# Patient Record
Sex: FEMALE | URBAN - METROPOLITAN AREA
[De-identification: names, ages, dates, MRNs, and addresses within clinical notes are randomized per-mention and may not be internally consistent; named-entity substitution may affect disease eponyms.]

---

## 2018-03-28 ENCOUNTER — APPOINTMENT (RX ONLY)
Dept: URBAN - METROPOLITAN AREA CLINIC 81 | Facility: CLINIC | Age: 73
Setting detail: DERMATOLOGY
End: 2018-03-28

## 2018-03-28 DIAGNOSIS — Z41.9 ENCOUNTER FOR PROCEDURE FOR PURPOSES OTHER THAN REMEDYING HEALTH STATE, UNSPECIFIED: ICD-10-CM

## 2018-03-28 PROCEDURE — ? JUVEDERM VOLLURE XC INJECTION

## 2018-03-28 NOTE — PROCEDURE: JUVEDERM VOLLURE XC INJECTION
Price (Use Numbers Only, No Special Characters Or $): 633 Price (Use Numbers Only, No Special Characters Or $): 536

## 2018-04-23 ENCOUNTER — APPOINTMENT (RX ONLY)
Dept: URBAN - METROPOLITAN AREA CLINIC 81 | Facility: CLINIC | Age: 73
Setting detail: DERMATOLOGY
End: 2018-04-23

## 2018-04-23 DIAGNOSIS — Z41.9 ENCOUNTER FOR PROCEDURE FOR PURPOSES OTHER THAN REMEDYING HEALTH STATE, UNSPECIFIED: ICD-10-CM

## 2018-04-23 PROCEDURE — ? COSMETIC EXCISION

## 2018-04-23 PROCEDURE — ? COSMETIC FOLLOW-UP

## 2018-04-23 ASSESSMENT — LOCATION DETAILED DESCRIPTION DERM
LOCATION DETAILED: LEFT LATERAL CANTHUS
LOCATION DETAILED: RIGHT LATERAL CANTHUS

## 2018-04-23 ASSESSMENT — LOCATION SIMPLE DESCRIPTION DERM
LOCATION SIMPLE: RIGHT EYELID
LOCATION SIMPLE: LEFT EYELID

## 2018-04-23 ASSESSMENT — LOCATION ZONE DERM: LOCATION ZONE: EYELID

## 2018-04-23 NOTE — HPI: OTHER
Condition:: Cosmetic recheck 8 months post lower lid blepharoplasty
Please Describe Your Condition:: Raised area at lateral lower lid

## 2018-09-25 ENCOUNTER — APPOINTMENT (RX ONLY)
Dept: URBAN - METROPOLITAN AREA CLINIC 81 | Facility: CLINIC | Age: 73
Setting detail: DERMATOLOGY
End: 2018-09-25

## 2018-09-25 DIAGNOSIS — L82.1 OTHER SEBORRHEIC KERATOSIS: ICD-10-CM

## 2018-09-25 DIAGNOSIS — L72.11 PILAR CYST: ICD-10-CM

## 2018-09-25 DIAGNOSIS — D22 MELANOCYTIC NEVI: ICD-10-CM

## 2018-09-25 DIAGNOSIS — D18.0 HEMANGIOMA: ICD-10-CM

## 2018-09-25 DIAGNOSIS — L81.4 OTHER MELANIN HYPERPIGMENTATION: ICD-10-CM

## 2018-09-25 DIAGNOSIS — Z85.828 PERSONAL HISTORY OF OTHER MALIGNANT NEOPLASM OF SKIN: ICD-10-CM

## 2018-09-25 PROBLEM — D18.01 HEMANGIOMA OF SKIN AND SUBCUTANEOUS TISSUE: Status: ACTIVE | Noted: 2018-09-25

## 2018-09-25 PROBLEM — D22.5 MELANOCYTIC NEVI OF TRUNK: Status: ACTIVE | Noted: 2018-09-25

## 2018-09-25 PROCEDURE — ? COUNSELING

## 2018-09-25 PROCEDURE — 99213 OFFICE O/P EST LOW 20 MIN: CPT

## 2018-09-25 PROCEDURE — ? REFERRAL

## 2018-09-25 ASSESSMENT — LOCATION ZONE DERM
LOCATION ZONE: TRUNK
LOCATION ZONE: SCALP
LOCATION ZONE: ARM

## 2018-09-25 ASSESSMENT — LOCATION DETAILED DESCRIPTION DERM
LOCATION DETAILED: LEFT CENTRAL FRONTAL SCALP
LOCATION DETAILED: RIGHT INFERIOR MEDIAL UPPER BACK
LOCATION DETAILED: LEFT PROXIMAL DORSAL FOREARM
LOCATION DETAILED: INFERIOR THORACIC SPINE
LOCATION DETAILED: PERIUMBILICAL SKIN

## 2018-09-25 ASSESSMENT — LOCATION SIMPLE DESCRIPTION DERM
LOCATION SIMPLE: UPPER BACK
LOCATION SIMPLE: ABDOMEN
LOCATION SIMPLE: LEFT FOREARM
LOCATION SIMPLE: LEFT SCALP
LOCATION SIMPLE: RIGHT UPPER BACK

## 2018-11-07 ENCOUNTER — APPOINTMENT (RX ONLY)
Dept: URBAN - METROPOLITAN AREA CLINIC 81 | Facility: CLINIC | Age: 73
Setting detail: DERMATOLOGY
End: 2018-11-07

## 2018-11-07 DIAGNOSIS — Z41.9 ENCOUNTER FOR PROCEDURE FOR PURPOSES OTHER THAN REMEDYING HEALTH STATE, UNSPECIFIED: ICD-10-CM

## 2018-11-07 PROCEDURE — ? JUVEDERM VOLUMA XC INJECTION

## 2018-11-07 PROCEDURE — ? ADDITIONAL NOTES

## 2018-11-07 NOTE — PROCEDURE: JUVEDERM VOLUMA XC INJECTION
Price (Use Numbers Only, No Special Characters Or $): 451 Price (Use Numbers Only, No Special Characters Or $): 663

## 2018-11-07 NOTE — PROCEDURE: JUVEDERM VOLUMA XC INJECTION
Price (Use Numbers Only, No Special Characters Or $): 827 Price (Use Numbers Only, No Special Characters Or $): 385

## 2019-04-04 ENCOUNTER — APPOINTMENT (RX ONLY)
Dept: URBAN - METROPOLITAN AREA CLINIC 81 | Facility: CLINIC | Age: 74
Setting detail: DERMATOLOGY
End: 2019-04-04

## 2019-04-04 DIAGNOSIS — Z41.9 ENCOUNTER FOR PROCEDURE FOR PURPOSES OTHER THAN REMEDYING HEALTH STATE, UNSPECIFIED: ICD-10-CM

## 2019-04-04 PROCEDURE — ? BOTOX

## 2019-04-04 PROCEDURE — ? JUVEDERM VOLLURE XC INJECTION

## 2019-04-04 PROCEDURE — ? JUVEDERM VOLUMA XC INJECTION

## 2019-04-04 NOTE — PROCEDURE: JUVEDERM VOLUMA XC INJECTION
Price (Use Numbers Only, No Special Characters Or $): 6631 Price (Use Numbers Only, No Special Characters Or $): 0527

## 2019-04-04 NOTE — PROCEDURE: JUVEDERM VOLLURE XC INJECTION
Price (Use Numbers Only, No Special Characters Or $): 310 Price (Use Numbers Only, No Special Characters Or $): 138

## 2019-04-04 NOTE — PROCEDURE: BOTOX
Price (Use Numbers Only, No Special Characters Or $): 388 Price (Use Numbers Only, No Special Characters Or $): 959

## 2019-04-17 ENCOUNTER — APPOINTMENT (RX ONLY)
Dept: URBAN - METROPOLITAN AREA CLINIC 81 | Facility: CLINIC | Age: 74
Setting detail: DERMATOLOGY
End: 2019-04-17

## 2019-04-17 DIAGNOSIS — Z41.9 ENCOUNTER FOR PROCEDURE FOR PURPOSES OTHER THAN REMEDYING HEALTH STATE, UNSPECIFIED: ICD-10-CM

## 2019-04-17 PROCEDURE — ? ADDITIONAL NOTES

## 2019-04-17 NOTE — PROCEDURE: ADDITIONAL NOTES
Additional Notes: Pt here stating upper eyelids are dropping/feeling heavy.\\nPer DF stated to give it a month. \\nPictures taken.
Detail Level: Detailed

## 2019-04-25 ENCOUNTER — APPOINTMENT (RX ONLY)
Dept: URBAN - METROPOLITAN AREA CLINIC 81 | Facility: CLINIC | Age: 74
Setting detail: DERMATOLOGY
End: 2019-04-25

## 2019-04-25 ENCOUNTER — RX ONLY (OUTPATIENT)
Age: 74
Setting detail: RX ONLY
End: 2019-04-25

## 2019-04-25 RX ORDER — PREDNISONE 10 MG/1
TABLET ORAL QAM
Qty: 30 | Refills: 0 | Status: ERX

## 2019-11-06 ENCOUNTER — APPOINTMENT (RX ONLY)
Dept: URBAN - METROPOLITAN AREA CLINIC 81 | Facility: CLINIC | Age: 74
Setting detail: DERMATOLOGY
End: 2019-11-06

## 2019-11-06 DIAGNOSIS — Z41.9 ENCOUNTER FOR PROCEDURE FOR PURPOSES OTHER THAN REMEDYING HEALTH STATE, UNSPECIFIED: ICD-10-CM

## 2019-11-06 PROCEDURE — ? JUVEDERM VOLLURE XC INJECTION

## 2019-11-06 NOTE — PROCEDURE: JUVEDERM VOLLURE XC INJECTION
Price (Use Numbers Only, No Special Characters Or $): 586 Price (Use Numbers Only, No Special Characters Or $): 958

## 2019-11-12 ENCOUNTER — APPOINTMENT (RX ONLY)
Dept: URBAN - METROPOLITAN AREA CLINIC 81 | Facility: CLINIC | Age: 74
Setting detail: DERMATOLOGY
End: 2019-11-12

## 2019-11-12 DIAGNOSIS — D18.0 HEMANGIOMA: ICD-10-CM

## 2019-11-12 DIAGNOSIS — D22 MELANOCYTIC NEVI: ICD-10-CM

## 2019-11-12 DIAGNOSIS — L81.4 OTHER MELANIN HYPERPIGMENTATION: ICD-10-CM

## 2019-11-12 DIAGNOSIS — L259 CONTACT DERMATITIS AND OTHER ECZEMA, UNSPECIFIED CAUSE: ICD-10-CM

## 2019-11-12 DIAGNOSIS — Z85.828 PERSONAL HISTORY OF OTHER MALIGNANT NEOPLASM OF SKIN: ICD-10-CM

## 2019-11-12 DIAGNOSIS — L82.1 OTHER SEBORRHEIC KERATOSIS: ICD-10-CM

## 2019-11-12 PROBLEM — D18.01 HEMANGIOMA OF SKIN AND SUBCUTANEOUS TISSUE: Status: ACTIVE | Noted: 2019-11-12

## 2019-11-12 PROBLEM — D22.5 MELANOCYTIC NEVI OF TRUNK: Status: ACTIVE | Noted: 2019-11-12

## 2019-11-12 PROBLEM — L30.8 OTHER SPECIFIED DERMATITIS: Status: ACTIVE | Noted: 2019-11-12

## 2019-11-12 PROCEDURE — 99214 OFFICE O/P EST MOD 30 MIN: CPT

## 2019-11-12 PROCEDURE — ? COUNSELING

## 2019-11-12 PROCEDURE — ? COUNSELING: TOPICAL STEROIDS

## 2019-11-12 PROCEDURE — ? ADDITIONAL NOTES

## 2019-11-12 PROCEDURE — ? PRESCRIPTION

## 2019-11-12 RX ORDER — MOMETASONE FUROATE 1 MG/G
CREAM TOPICAL BID
Qty: 1 | Refills: 1 | Status: ERX | COMMUNITY
Start: 2019-11-12

## 2019-11-12 RX ADMIN — MOMETASONE FUROATE: 1 CREAM TOPICAL at 00:00

## 2019-11-12 ASSESSMENT — LOCATION SIMPLE DESCRIPTION DERM
LOCATION SIMPLE: RIGHT UPPER BACK
LOCATION SIMPLE: UPPER BACK
LOCATION SIMPLE: LEFT FOREARM
LOCATION SIMPLE: ABDOMEN

## 2019-11-12 ASSESSMENT — LOCATION ZONE DERM
LOCATION ZONE: ARM
LOCATION ZONE: TRUNK

## 2019-11-12 ASSESSMENT — LOCATION DETAILED DESCRIPTION DERM
LOCATION DETAILED: RIGHT INFERIOR MEDIAL UPPER BACK
LOCATION DETAILED: EPIGASTRIC SKIN
LOCATION DETAILED: INFERIOR THORACIC SPINE
LOCATION DETAILED: LEFT PROXIMAL DORSAL FOREARM
LOCATION DETAILED: PERIUMBILICAL SKIN

## 2019-11-12 NOTE — PROCEDURE: MIPS QUALITY
Quality 431: Preventive Care And Screening: Unhealthy Alcohol Use - Screening: Patient screened for unhealthy alcohol use using a single question and scores less than 2 times per year
Quality 110: Preventive Care And Screening: Influenza Immunization: Influenza Immunization Administered during Influenza season
Quality 111:Pneumonia Vaccination Status For Older Adults: Pneumococcal Vaccination Previously Received
Detail Level: Detailed
Quality 130: Documentation Of Current Medications In The Medical Record: Current Medications Documented
Quality 402: Tobacco Use And Help With Quitting Among Adolescents: Patient screened for tobacco and never smoked

## 2019-11-12 NOTE — PROCEDURE: ADDITIONAL NOTES
Detail Level: Zone
Additional Notes: Advised pt if no imp or if rash recurs, to RTC for punch bx. Pt understands and agrees

## 2020-07-30 ENCOUNTER — APPOINTMENT (RX ONLY)
Dept: URBAN - METROPOLITAN AREA CLINIC 81 | Facility: CLINIC | Age: 75
Setting detail: DERMATOLOGY
End: 2020-07-30

## 2020-07-30 DIAGNOSIS — Z41.9 ENCOUNTER FOR PROCEDURE FOR PURPOSES OTHER THAN REMEDYING HEALTH STATE, UNSPECIFIED: ICD-10-CM

## 2020-07-30 PROCEDURE — ? JUVEDERM VOLLURE XC INJECTION

## 2020-07-30 NOTE — PROCEDURE: JUVEDERM VOLLURE XC INJECTION
Price (Use Numbers Only, No Special Characters Or $): 550 Price (Use Numbers Only, No Special Characters Or $): 564

## 2020-08-06 ENCOUNTER — APPOINTMENT (RX ONLY)
Dept: URBAN - METROPOLITAN AREA CLINIC 81 | Facility: CLINIC | Age: 75
Setting detail: DERMATOLOGY
End: 2020-08-06

## 2020-08-06 DIAGNOSIS — Z41.9 ENCOUNTER FOR PROCEDURE FOR PURPOSES OTHER THAN REMEDYING HEALTH STATE, UNSPECIFIED: ICD-10-CM

## 2020-08-06 PROCEDURE — ? OTHER (COSMETIC)

## 2020-08-06 NOTE — PROCEDURE: OTHER (COSMETIC)
Other (Free Text): Cool Peel
Price (Use Numbers Only, No Special Characters Or $): 500
Detail Level: Zone

## 2020-09-08 ENCOUNTER — APPOINTMENT (RX ONLY)
Dept: URBAN - METROPOLITAN AREA CLINIC 81 | Facility: CLINIC | Age: 75
Setting detail: DERMATOLOGY
End: 2020-09-08

## 2020-09-08 DIAGNOSIS — Z41.9 ENCOUNTER FOR PROCEDURE FOR PURPOSES OTHER THAN REMEDYING HEALTH STATE, UNSPECIFIED: ICD-10-CM

## 2020-09-08 PROCEDURE — ? OTHER (COSMETIC)

## 2020-09-08 NOTE — PROCEDURE: OTHER (COSMETIC)
Detail Level: Zone
Other (Free Text): Cool peel, 2nd treatment
Price (Use Numbers Only, No Special Characters Or $): 500

## 2021-03-25 ENCOUNTER — APPOINTMENT (RX ONLY)
Dept: URBAN - METROPOLITAN AREA CLINIC 81 | Facility: CLINIC | Age: 76
Setting detail: DERMATOLOGY
End: 2021-03-25

## 2021-03-25 DIAGNOSIS — Z41.9 ENCOUNTER FOR PROCEDURE FOR PURPOSES OTHER THAN REMEDYING HEALTH STATE, UNSPECIFIED: ICD-10-CM

## 2021-03-25 PROCEDURE — ? JUVEDERM VOLLURE XC INJECTION

## 2021-03-25 NOTE — PROCEDURE: JUVEDERM VOLLURE XC INJECTION
Price (Use Numbers Only, No Special Characters Or $): 958 Price (Use Numbers Only, No Special Characters Or $): 833

## 2021-07-08 ENCOUNTER — APPOINTMENT (RX ONLY)
Dept: URBAN - METROPOLITAN AREA CLINIC 81 | Facility: CLINIC | Age: 76
Setting detail: DERMATOLOGY
End: 2021-07-08

## 2021-07-08 DIAGNOSIS — D485 NEOPLASM OF UNCERTAIN BEHAVIOR OF SKIN: ICD-10-CM

## 2021-07-08 DIAGNOSIS — L57.8 OTHER SKIN CHANGES DUE TO CHRONIC EXPOSURE TO NONIONIZING RADIATION: ICD-10-CM

## 2021-07-08 PROBLEM — D48.5 NEOPLASM OF UNCERTAIN BEHAVIOR OF SKIN: Status: ACTIVE | Noted: 2021-07-08

## 2021-07-08 PROCEDURE — 99213 OFFICE O/P EST LOW 20 MIN: CPT

## 2021-07-08 PROCEDURE — ? ADDITIONAL NOTES

## 2021-07-08 PROCEDURE — ? COUNSELING

## 2021-07-08 ASSESSMENT — LOCATION SIMPLE DESCRIPTION DERM
LOCATION SIMPLE: SCALP
LOCATION SIMPLE: LEFT CHEEK

## 2021-07-08 ASSESSMENT — LOCATION ZONE DERM
LOCATION ZONE: FACE
LOCATION ZONE: SCALP

## 2021-07-08 ASSESSMENT — LOCATION DETAILED DESCRIPTION DERM
LOCATION DETAILED: LEFT CENTRAL PARIETAL SCALP
LOCATION DETAILED: LEFT INFERIOR CENTRAL MALAR CHEEK

## 2021-07-08 NOTE — HPI: SKIN LESION
What Type Of Note Output Would You Prefer (Optional)?: Bullet Format
Has Your Skin Lesion Been Treated?: not been treated
Is This A New Presentation, Or A Follow-Up?: Skin Lesion
Additional History: She reports having head aches that started occurring two weeks prior to her office visit. She also mentions having numbness and tingling on her lips.

## 2021-07-08 NOTE — PROCEDURE: ADDITIONAL NOTES
Detail Level: Detailed
Render Risk Assessment In Note?: no
Additional Notes: Advised to see head and neck surgeon Dr. Bro.

## 2022-03-31 ENCOUNTER — APPOINTMENT (RX ONLY)
Dept: URBAN - METROPOLITAN AREA CLINIC 81 | Facility: CLINIC | Age: 77
Setting detail: DERMATOLOGY
End: 2022-03-31

## 2022-03-31 DIAGNOSIS — Z41.9 ENCOUNTER FOR PROCEDURE FOR PURPOSES OTHER THAN REMEDYING HEALTH STATE, UNSPECIFIED: ICD-10-CM

## 2022-03-31 PROCEDURE — ? JUVEDERM VOLUMA XC INJECTION

## 2022-03-31 NOTE — PROCEDURE: JUVEDERM VOLUMA XC INJECTION
Price (Use Numbers Only, No Special Characters Or $): 3146 Price (Use Numbers Only, No Special Characters Or $): 4624

## 2022-07-21 ENCOUNTER — APPOINTMENT (RX ONLY)
Dept: URBAN - METROPOLITAN AREA CLINIC 81 | Facility: CLINIC | Age: 77
Setting detail: DERMATOLOGY
End: 2022-07-21

## 2022-07-21 DIAGNOSIS — Z41.9 ENCOUNTER FOR PROCEDURE FOR PURPOSES OTHER THAN REMEDYING HEALTH STATE, UNSPECIFIED: ICD-10-CM

## 2022-07-21 PROCEDURE — ? BOTOX

## 2022-07-21 NOTE — PROCEDURE: BOTOX
Price (Use Numbers Only, No Special Characters Or $): 833 Price (Use Numbers Only, No Special Characters Or $): 793

## 2022-11-28 ENCOUNTER — APPOINTMENT (RX ONLY)
Dept: URBAN - METROPOLITAN AREA CLINIC 81 | Facility: CLINIC | Age: 77
Setting detail: DERMATOLOGY
End: 2022-11-28

## 2022-11-28 DIAGNOSIS — Z41.9 ENCOUNTER FOR PROCEDURE FOR PURPOSES OTHER THAN REMEDYING HEALTH STATE, UNSPECIFIED: ICD-10-CM

## 2022-11-28 PROCEDURE — ? BOTOX

## 2022-11-28 PROCEDURE — ? JUVEDERM VOLLURE XC INJECTION

## 2022-11-28 ASSESSMENT — LOCATION SIMPLE DESCRIPTION DERM
LOCATION SIMPLE: LEFT CHEEK
LOCATION SIMPLE: RIGHT CHEEK

## 2022-11-28 ASSESSMENT — LOCATION DETAILED DESCRIPTION DERM
LOCATION DETAILED: RIGHT INFERIOR MEDIAL MALAR CHEEK
LOCATION DETAILED: LEFT INFERIOR MEDIAL MALAR CHEEK

## 2022-11-28 ASSESSMENT — LOCATION ZONE DERM: LOCATION ZONE: FACE

## 2022-11-28 NOTE — PROCEDURE: BOTOX
Price (Use Numbers Only, No Special Characters Or $): 947 Price (Use Numbers Only, No Special Characters Or $): 648

## 2022-11-28 NOTE — PROCEDURE: JUVEDERM VOLLURE XC INJECTION
Price (Use Numbers Only, No Special Characters Or $): 878 Price (Use Numbers Only, No Special Characters Or $): 811

## 2023-06-07 ENCOUNTER — APPOINTMENT (RX ONLY)
Dept: URBAN - METROPOLITAN AREA CLINIC 81 | Facility: CLINIC | Age: 78
Setting detail: DERMATOLOGY
End: 2023-06-07

## 2023-06-07 DIAGNOSIS — L57.8 OTHER SKIN CHANGES DUE TO CHRONIC EXPOSURE TO NONIONIZING RADIATION: ICD-10-CM

## 2023-06-07 DIAGNOSIS — L82.1 OTHER SEBORRHEIC KERATOSIS: ICD-10-CM

## 2023-06-07 DIAGNOSIS — D18.0 HEMANGIOMA: ICD-10-CM

## 2023-06-07 DIAGNOSIS — L81.4 OTHER MELANIN HYPERPIGMENTATION: ICD-10-CM

## 2023-06-07 DIAGNOSIS — D22 MELANOCYTIC NEVI: ICD-10-CM

## 2023-06-07 PROBLEM — D18.01 HEMANGIOMA OF SKIN AND SUBCUTANEOUS TISSUE: Status: ACTIVE | Noted: 2023-06-07

## 2023-06-07 PROBLEM — D22.5 MELANOCYTIC NEVI OF TRUNK: Status: ACTIVE | Noted: 2023-06-07

## 2023-06-07 PROCEDURE — ? SUNSCREEN RECOMMENDATIONS

## 2023-06-07 PROCEDURE — ? COUNSELING

## 2023-06-07 PROCEDURE — 99213 OFFICE O/P EST LOW 20 MIN: CPT

## 2023-06-07 ASSESSMENT — LOCATION SIMPLE DESCRIPTION DERM
LOCATION SIMPLE: RIGHT LOWER BACK
LOCATION SIMPLE: RIGHT FOREARM
LOCATION SIMPLE: RIGHT UPPER BACK
LOCATION SIMPLE: LEFT PRETIBIAL REGION
LOCATION SIMPLE: LEFT FOREARM
LOCATION SIMPLE: RIGHT FOREHEAD
LOCATION SIMPLE: CHEST
LOCATION SIMPLE: RIGHT PRETIBIAL REGION

## 2023-06-07 ASSESSMENT — LOCATION DETAILED DESCRIPTION DERM
LOCATION DETAILED: RIGHT INFERIOR MEDIAL FOREHEAD
LOCATION DETAILED: LEFT VENTRAL PROXIMAL FOREARM
LOCATION DETAILED: RIGHT SUPERIOR MEDIAL MIDBACK
LOCATION DETAILED: RIGHT VENTRAL PROXIMAL FOREARM
LOCATION DETAILED: LEFT PROXIMAL PRETIBIAL REGION
LOCATION DETAILED: RIGHT MID-UPPER BACK
LOCATION DETAILED: RIGHT PROXIMAL PRETIBIAL REGION
LOCATION DETAILED: RIGHT SUPERIOR UPPER BACK
LOCATION DETAILED: LEFT LATERAL SUPERIOR CHEST
LOCATION DETAILED: RIGHT SUPERIOR MEDIAL LOWER BACK

## 2023-06-07 ASSESSMENT — LOCATION ZONE DERM
LOCATION ZONE: LEG
LOCATION ZONE: FACE
LOCATION ZONE: ARM
LOCATION ZONE: TRUNK

## 2023-07-14 ENCOUNTER — APPOINTMENT (RX ONLY)
Dept: URBAN - METROPOLITAN AREA CLINIC 81 | Facility: CLINIC | Age: 78
Setting detail: DERMATOLOGY
End: 2023-07-14

## 2023-07-14 DIAGNOSIS — Z41.9 ENCOUNTER FOR PROCEDURE FOR PURPOSES OTHER THAN REMEDYING HEALTH STATE, UNSPECIFIED: ICD-10-CM

## 2023-07-14 PROCEDURE — ? BOTOX

## 2023-07-14 PROCEDURE — ? JUVEDERM VOLUX XC INJECTION

## 2023-07-14 NOTE — PROCEDURE: BOTOX
Price (Use Numbers Only, No Special Characters Or $): 755 Price (Use Numbers Only, No Special Characters Or $): 197

## 2023-07-14 NOTE — PROCEDURE: JUVEDERM VOLUX XC INJECTION
Price (Use Numbers Only, No Special Characters Or $): 4649 Price (Use Numbers Only, No Special Characters Or $): 7961

## 2024-06-19 ENCOUNTER — APPOINTMENT (RX ONLY)
Dept: URBAN - METROPOLITAN AREA CLINIC 81 | Facility: CLINIC | Age: 79
Setting detail: DERMATOLOGY
End: 2024-06-19

## 2024-06-19 DIAGNOSIS — Z41.9 ENCOUNTER FOR PROCEDURE FOR PURPOSES OTHER THAN REMEDYING HEALTH STATE, UNSPECIFIED: ICD-10-CM

## 2024-06-19 PROCEDURE — ? BOTOX (U OR CC)

## 2024-06-19 ASSESSMENT — LOCATION DETAILED DESCRIPTION DERM
LOCATION DETAILED: INFERIOR MID FOREHEAD
LOCATION DETAILED: LEFT INFERIOR MEDIAL FOREHEAD

## 2024-06-19 ASSESSMENT — LOCATION SIMPLE DESCRIPTION DERM
LOCATION SIMPLE: INFERIOR FOREHEAD
LOCATION SIMPLE: LEFT FOREHEAD

## 2024-06-19 ASSESSMENT — LOCATION ZONE DERM: LOCATION ZONE: FACE

## 2024-06-19 NOTE — PROCEDURE: BOTOX (U OR CC)
Detail Level: Zone
Cash Override (Optional): 699
Consent: Written consent obtained. Risks include but not limited to lid/brow ptosis, bruising, swelling, diplopia, temporary effect, incomplete chemical denervation.
Post-Care Instructions: Patient instructed to not lie down for 4 hours and limit physical activity for 24 hours.
Dilution (U/0.1 Cc): 4
Lot #: [id number]
Expiration Date (Month Year): 2026/04
Measure In Units Or Cc's?: units
Quantity Per Injection Site (Units Or Cc): 26

## 2024-07-25 ENCOUNTER — APPOINTMENT (RX ONLY)
Dept: URBAN - METROPOLITAN AREA CLINIC 81 | Facility: CLINIC | Age: 79
Setting detail: DERMATOLOGY
End: 2024-07-25

## 2024-07-25 DIAGNOSIS — Z41.9 ENCOUNTER FOR PROCEDURE FOR PURPOSES OTHER THAN REMEDYING HEALTH STATE, UNSPECIFIED: ICD-10-CM

## 2024-07-25 PROCEDURE — ? JUVEDERM VOLUMA XC INJECTION

## 2024-07-25 NOTE — PROCEDURE: JUVEDERM VOLUMA XC INJECTION
Number Of Syringes (Required For Inventory): 1
Detail Level: Detailed
[place] Filler Volume In Cc: 0
Consent: Written consent obtained. Risks include but not limited to bruising, beading, irregular texture, ulceration, infection, allergic reaction, scar formation, incomplete augmentation, temporary nature, procedural pain.
Lot #: [id number]
Anesthesia Volume In Cc: 0.5
Procedural Text: The filler was administered to the treatment areas noted above.
Map Statment: See Attach Map for Complete Details
Include Cannula Information In Note?: No
Price (Use Numbers Only, No Special Characters Or $): 6702
Filler: Juvederm Voluma XC
Post-Care Instructions: Patient instructed to apply ice to reduce swelling.
Anesthesia Type: 1% lidocaine with epinephrine
Additional Anesthesia Volume In Cc: 6
Expiration Date (Month Year): 12-2-24

## 2024-10-31 ENCOUNTER — APPOINTMENT (RX ONLY)
Dept: URBAN - METROPOLITAN AREA CLINIC 81 | Facility: CLINIC | Age: 79
Setting detail: DERMATOLOGY
End: 2024-10-31

## 2024-10-31 DIAGNOSIS — L72.0 EPIDERMAL CYST: ICD-10-CM | Status: WORSENING

## 2024-10-31 DIAGNOSIS — L21.8 OTHER SEBORRHEIC DERMATITIS: ICD-10-CM | Status: WORSENING

## 2024-10-31 PROCEDURE — ? PRESCRIPTION

## 2024-10-31 PROCEDURE — 10040 EXTRACTION: CPT

## 2024-10-31 PROCEDURE — 99214 OFFICE O/P EST MOD 30 MIN: CPT | Mod: 25

## 2024-10-31 PROCEDURE — ? ACNE SURGERY

## 2024-10-31 PROCEDURE — ? COUNSELING

## 2024-10-31 RX ORDER — KETOCONAZOLE 20 MG/G
CREAM TOPICAL
Qty: 30 | Refills: 2 | Status: ERX | COMMUNITY
Start: 2024-10-31

## 2024-10-31 RX ORDER — MOMETASONE FUROATE 1 MG/G
CREAM TOPICAL BID
Qty: 45 | Refills: 1 | Status: ERX

## 2024-10-31 RX ADMIN — KETOCONAZOLE: 20 CREAM TOPICAL at 00:00

## 2024-10-31 ASSESSMENT — LOCATION DETAILED DESCRIPTION DERM
LOCATION DETAILED: LEFT MEDIAL FOREHEAD
LOCATION DETAILED: RIGHT CENTRAL MALAR CHEEK
LOCATION DETAILED: LEFT INFERIOR CENTRAL MALAR CHEEK
LOCATION DETAILED: RIGHT INFERIOR CENTRAL MALAR CHEEK

## 2024-10-31 ASSESSMENT — LOCATION SIMPLE DESCRIPTION DERM
LOCATION SIMPLE: LEFT FOREHEAD
LOCATION SIMPLE: RIGHT CHEEK
LOCATION SIMPLE: LEFT CHEEK

## 2024-10-31 ASSESSMENT — LOCATION ZONE DERM: LOCATION ZONE: FACE

## 2024-10-31 NOTE — PROCEDURE: ACNE SURGERY
Extraction Method: 15 blade and q-tip
Render Post-Care Instructions In Note?: no
Hemostasis: Pinpoint electrocautery
Consent was obtained and risks were reviewed including but not limited to scarring, infection, bleeding, scabbing, incomplete removal, and allergy to anesthesia.
Detail Level: Detailed
Post-Care Instructions: I reviewed with the patient in detail post-care instructions. Patient is to keep the treatment areas dry overnight, and then apply bacitracin twice daily until healed. Patient may apply hydrogen peroxide soaks to remove any crusting.
Acne Type: Cysts
Prep Text (Optional): Prior to removal the treatment areas were prepped in the usual fashion.

## 2025-05-30 ENCOUNTER — APPOINTMENT (OUTPATIENT)
Dept: URBAN - METROPOLITAN AREA CLINIC 81 | Facility: CLINIC | Age: 80
Setting detail: DERMATOLOGY
End: 2025-05-30

## 2025-05-30 DIAGNOSIS — Z41.9 ENCOUNTER FOR PROCEDURE FOR PURPOSES OTHER THAN REMEDYING HEALTH STATE, UNSPECIFIED: ICD-10-CM

## 2025-05-30 PROCEDURE — ? JUVEDERM VOLUX XC INJECTION

## 2025-05-30 PROCEDURE — ? BOTOX

## 2025-05-30 NOTE — PROCEDURE: BOTOX
R Brow Units: 0
Show Forehead Units: Yes
Detail Level: Detailed
Show Right And Left Pupillary Line Units: No
Price (Use Numbers Only, No Special Characters Or $): 486
Expiration Date (Month Year): 5/27
Consent: Written consent obtained. Risks include but not limited to lid/brow ptosis, bruising, swelling, diplopia, temporary effect, incomplete chemical denervation.
Post-Care Instructions: Patient instructed to not lie down for 4 hours and limit physical activity for 24 hours. Patient instructed not to travel by airplane for 48 hours.
Incrementing Botox Units: By 0.5 Units
Lot #: [id number]
Forehead Units: 50
Dilution (U/0.1 Cc): 2.5

## 2025-05-30 NOTE — PROCEDURE: JUVEDERM VOLUX XC INJECTION
Tear Troughs Filler Volume In Cc: 0
Map Statment: See Attach Map for Complete Details
Include Cannula Information In Note?: No
Detail Level: Detailed
Post-Care Instructions: Patient instructed to apply ice to reduce swelling.
Anesthesia Volume In Cc: 0.5
Filler: Juvederm Volux XC
Expiration Date (Month Year): 9-16-26
Anesthesia Type: 1% lidocaine with epinephrine
Additional Anesthesia Volume In Cc: 6
Price (Use Numbers Only, No Special Characters Or $): 1600
Consent: Written consent obtained. Risks include but not limited to bruising, beading, irregular texture, ulceration, infection, allergic reaction, scar formation, incomplete augmentation, temporary nature, procedural pain.
Lot #: [id number]
Procedural Text: The filler was administered to the treatment areas noted above.
Number Of Syringes (Required For Inventory): 2

## 2025-06-07 ENCOUNTER — APPOINTMENT (OUTPATIENT)
Dept: URBAN - METROPOLITAN AREA CLINIC 81 | Facility: CLINIC | Age: 80
Setting detail: DERMATOLOGY
End: 2025-06-07

## 2025-06-07 DIAGNOSIS — Z41.9 ENCOUNTER FOR PROCEDURE FOR PURPOSES OTHER THAN REMEDYING HEALTH STATE, UNSPECIFIED: ICD-10-CM

## 2025-06-07 PROCEDURE — ? SMARTXIDE TETRA CO2 LASER- COOLPEEL

## 2025-06-07 NOTE — PROCEDURE: SMARTXIDE TETRA CO2 LASER- COOLPEEL
Spray Mode: on
Consent: Written consent obtained.  Risks reviewed including but not limited to erythema, swelling, crusting, scabbing, blistering, scarring, and darker or lighter pigmentary change.  Patient understands that results are not guaranteed and multiple treatments may be needed to achieve desired result.
Price (Use Numbers Only, No Special Characters Or $): 384
Number Of Passes: 1
Add Another Setting?: no
Procedure Note: Treatment was administered using the setting parameters listed above.
Tracking: [employer]
Number Of Prepaid Treatments (Will Not Render If 0): 0
Anesthesia Type: 1% lidocaine with epinephrine
Post-Care Instructions: I reviewed with the patient in detail post-care instructions. Patient should avoid sun exposure before and after treatment.  Diligent sunscreen use and sun avoidance advised.
Detail Level: Simple